# Patient Record
Sex: FEMALE | Race: BLACK OR AFRICAN AMERICAN | Employment: UNEMPLOYED | URBAN - METROPOLITAN AREA
[De-identification: names, ages, dates, MRNs, and addresses within clinical notes are randomized per-mention and may not be internally consistent; named-entity substitution may affect disease eponyms.]

---

## 2019-04-23 ENCOUNTER — MEDICAL CORRESPONDENCE (OUTPATIENT)
Dept: HEALTH INFORMATION MANAGEMENT | Facility: CLINIC | Age: 54
End: 2019-04-23

## 2019-04-23 ENCOUNTER — TRANSFERRED RECORDS (OUTPATIENT)
Dept: HEALTH INFORMATION MANAGEMENT | Facility: CLINIC | Age: 54
End: 2019-04-23

## 2019-05-21 ENCOUNTER — OFFICE VISIT (OUTPATIENT)
Dept: OPHTHALMOLOGY | Facility: CLINIC | Age: 54
End: 2019-05-21
Attending: OPHTHALMOLOGY
Payer: COMMERCIAL

## 2019-05-21 DIAGNOSIS — Z96.1 PSEUDOPHAKIA: ICD-10-CM

## 2019-05-21 DIAGNOSIS — H25.811 COMBINED FORM OF AGE-RELATED CATARACT, RIGHT EYE: Primary | ICD-10-CM

## 2019-05-21 PROCEDURE — 76519 ECHO EXAM OF EYE: CPT | Mod: ZF | Performed by: OPHTHALMOLOGY

## 2019-05-21 PROCEDURE — 92015 DETERMINE REFRACTIVE STATE: CPT | Mod: ZF

## 2019-05-21 PROCEDURE — G0463 HOSPITAL OUTPT CLINIC VISIT: HCPCS | Mod: ZF

## 2019-05-21 ASSESSMENT — REFRACTION_WEARINGRX
OD_ADD: +2.25
OS_ADD: +2.25
OS_SPHERE: -2.50
OD_SPHERE: +1.50
OS_CYLINDER: +1.00
OS_AXIS: 035

## 2019-05-21 ASSESSMENT — TONOMETRY
OS_IOP_MMHG: 10
IOP_METHOD: TONOPEN
OD_IOP_MMHG: 11

## 2019-05-21 ASSESSMENT — CONF VISUAL FIELD
METHOD: COUNTING FINGERS
OS_NORMAL: 1
OD_NORMAL: 1

## 2019-05-21 ASSESSMENT — REFRACTION_MANIFEST
OD_SPHERE: -1.00
OD_CYLINDER: +1.50
OS_CYLINDER: +0.25
OS_SPHERE: -0.75
OS_AXIS: 100
OD_ADD: +2.50
OS_ADD: +2.50
OD_AXIS: 005

## 2019-05-21 ASSESSMENT — CUP TO DISC RATIO
OD_RATIO: 0.5
OS_RATIO: 0.5

## 2019-05-21 ASSESSMENT — SLIT LAMP EXAM - LIDS: COMMENTS: NORMAL

## 2019-05-21 ASSESSMENT — EXTERNAL EXAM - LEFT EYE: OS_EXAM: NORMAL

## 2019-05-21 ASSESSMENT — VISUAL ACUITY
OS_SC: 20/30
OD_PH_SC: 20/40
METHOD: SNELLEN - LINEAR
OS_PH_SC: 20/25
OD_SC: 20/60

## 2019-05-21 ASSESSMENT — EXTERNAL EXAM - RIGHT EYE: OD_EXAM: NORMAL

## 2019-05-21 NOTE — NURSING NOTE
Chief Complaints and History of Present Illnesses   Patient presents with     Cataract     Chief Complaint(s) and History of Present Illness(es)     Cataract               Comments     Eye exam to consider cataract surgery for the right eye.  The patient denies eye pain.  She has notices right eye blurry vision.  RUDDY Kerns 2:23 PM 05/21/2019

## 2019-05-21 NOTE — PROGRESS NOTES
Chief Complaint(s) and History of Present Illness(es)     Cataract               Comments     Eye exam to consider cataract surgery for the right eye.  The patient denies eye pain.  She has notices right eye blurry vision.  RUDDY Kerns 2:23 PM 05/21/2019             Review of systems for the eyes was negative other than the pertinent positives/negatives listed in the HPI.      Assessment & Plan      Shantel Jamison is a 54 year old female with the following diagnoses:   1. Combined form of age-related cataract, right eye    2. Pseudophakia - Left Eye         Cataract, right eye  Visually significant with blur and glare    Risks, benefits and alternatives to cataract extraction/IOL implantation discussed; consent obtained.  Will schedule surgery today    Special equipment/needs:    Anesthesia:MAC with block  Dilation:Moderate  Iris expansion:IFIS  Pseudoexfoliation: No pseudoexfoliation  Trypan Blue: Yes  SN60  Goal emmetropia  Will call to schedule after Ramadan         Attending Physician Attestation:  Complete documentation of historical and exam elements from today's encounter can be found in the full encounter summary report (not reduplicated in this progress note).  I personally obtained the chief complaint(s) and history of present illness.  I confirmed and edited as necessary the review of systems, past medical/surgical history, family history, social history, and examination findings as documented by others; and I examined the patient myself.  I personally reviewed the relevant tests, images, and reports as documented above.  I formulated and edited as necessary the assessment and plan and discussed the findings and management plan with the patient and family. . - Sourav Doherty MD

## 2019-06-14 ENCOUNTER — HOSPITAL ENCOUNTER (OUTPATIENT)
Facility: AMBULATORY SURGERY CENTER | Age: 54
End: 2019-06-14
Attending: OPHTHALMOLOGY
Payer: COMMERCIAL

## 2019-06-14 ENCOUNTER — TELEPHONE (OUTPATIENT)
Dept: OPHTHALMOLOGY | Facility: CLINIC | Age: 54
End: 2019-06-14

## 2019-06-14 DIAGNOSIS — H25.811 COMBINED FORM OF AGE-RELATED CATARACT, RIGHT EYE: Primary | ICD-10-CM

## 2019-06-14 NOTE — TELEPHONE ENCOUNTER
Patient is scheduled for surgery with Dr. Doherty     Spoke or left message with: patient    Date of Surgery: 7/1    [place] [facility] [place]    Informed patient they will need an adult  yes    H&P: Scheduled with [facility]    Surgery packet: mailed outside 6/14

## 2019-06-28 ENCOUNTER — ANESTHESIA EVENT (OUTPATIENT)
Dept: SURGERY | Facility: AMBULATORY SURGERY CENTER | Age: 54
End: 2019-06-28

## 2019-06-28 RX ORDER — KETOROLAC TROMETHAMINE 30 MG/ML
30 INJECTION, SOLUTION INTRAMUSCULAR; INTRAVENOUS EVERY 6 HOURS PRN
Status: CANCELLED | OUTPATIENT
Start: 2019-06-28 | End: 2019-07-03

## 2019-06-28 RX ORDER — ACETAMINOPHEN 325 MG/1
975 TABLET ORAL ONCE
Status: CANCELLED | OUTPATIENT
Start: 2019-06-28 | End: 2019-06-28

## 2019-06-28 RX ORDER — PHENYLEPHRINE HYDROCHLORIDE 25 MG/ML
1 SOLUTION/ DROPS OPHTHALMIC
Status: CANCELLED | OUTPATIENT
Start: 2019-06-28

## 2019-06-28 RX ORDER — FENTANYL CITRATE 50 UG/ML
25-50 INJECTION, SOLUTION INTRAMUSCULAR; INTRAVENOUS EVERY 5 MIN PRN
Status: CANCELLED | OUTPATIENT
Start: 2019-06-28

## 2019-06-28 RX ORDER — ONDANSETRON 4 MG/1
4 TABLET, ORALLY DISINTEGRATING ORAL EVERY 30 MIN PRN
Status: CANCELLED | OUTPATIENT
Start: 2019-06-28

## 2019-06-28 RX ORDER — PROPARACAINE HYDROCHLORIDE 5 MG/ML
1 SOLUTION/ DROPS OPHTHALMIC ONCE
Status: CANCELLED | OUTPATIENT
Start: 2019-06-28 | End: 2019-06-28

## 2019-06-28 RX ORDER — TROPICAMIDE 10 MG/ML
1 SOLUTION/ DROPS OPHTHALMIC
Status: CANCELLED | OUTPATIENT
Start: 2019-06-28

## 2019-06-28 RX ORDER — NALOXONE HYDROCHLORIDE 0.4 MG/ML
.1-.4 INJECTION, SOLUTION INTRAMUSCULAR; INTRAVENOUS; SUBCUTANEOUS
Status: CANCELLED | OUTPATIENT
Start: 2019-06-28 | End: 2019-06-29

## 2019-06-28 RX ORDER — SODIUM CHLORIDE, SODIUM LACTATE, POTASSIUM CHLORIDE, CALCIUM CHLORIDE 600; 310; 30; 20 MG/100ML; MG/100ML; MG/100ML; MG/100ML
INJECTION, SOLUTION INTRAVENOUS CONTINUOUS
Status: CANCELLED | OUTPATIENT
Start: 2019-06-28

## 2019-06-28 RX ORDER — OXYCODONE HYDROCHLORIDE 5 MG/1
5 TABLET ORAL EVERY 4 HOURS PRN
Status: CANCELLED | OUTPATIENT
Start: 2019-06-28

## 2019-06-28 RX ORDER — CYCLOPENTOLATE HYDROCHLORIDE 10 MG/ML
1 SOLUTION/ DROPS OPHTHALMIC
Status: CANCELLED | OUTPATIENT
Start: 2019-06-28

## 2019-06-28 RX ORDER — ONDANSETRON 2 MG/ML
4 INJECTION INTRAMUSCULAR; INTRAVENOUS EVERY 30 MIN PRN
Status: CANCELLED | OUTPATIENT
Start: 2019-06-28

## 2019-07-01 ENCOUNTER — ANESTHESIA (OUTPATIENT)
Dept: SURGERY | Facility: AMBULATORY SURGERY CENTER | Age: 54
End: 2019-07-01

## 2019-07-01 VITALS — HEIGHT: 67 IN | BODY MASS INDEX: 33.9 KG/M2 | WEIGHT: 216 LBS

## 2019-07-01 ASSESSMENT — MIFFLIN-ST. JEOR: SCORE: 1607.4

## 2019-08-19 ENCOUNTER — ANESTHESIA EVENT (OUTPATIENT)
Dept: SURGERY | Facility: AMBULATORY SURGERY CENTER | Age: 54
End: 2019-08-19

## 2019-08-19 ENCOUNTER — ANESTHESIA (OUTPATIENT)
Dept: SURGERY | Facility: AMBULATORY SURGERY CENTER | Age: 54
End: 2019-08-19

## 2019-08-19 ENCOUNTER — HOSPITAL ENCOUNTER (OUTPATIENT)
Facility: AMBULATORY SURGERY CENTER | Age: 54
End: 2019-08-19
Attending: OPHTHALMOLOGY
Payer: COMMERCIAL

## 2019-08-19 ENCOUNTER — OFFICE VISIT (OUTPATIENT)
Dept: OPHTHALMOLOGY | Facility: CLINIC | Age: 54
End: 2019-08-19
Payer: COMMERCIAL

## 2019-08-19 ENCOUNTER — OFFICE VISIT (OUTPATIENT)
Dept: INTERPRETER SERVICES | Facility: CLINIC | Age: 54
End: 2019-08-19
Payer: COMMERCIAL

## 2019-08-19 VITALS
BODY MASS INDEX: 33.19 KG/M2 | HEIGHT: 68 IN | RESPIRATION RATE: 16 BRPM | OXYGEN SATURATION: 96 % | SYSTOLIC BLOOD PRESSURE: 132 MMHG | TEMPERATURE: 97.4 F | WEIGHT: 219 LBS | HEART RATE: 74 BPM | DIASTOLIC BLOOD PRESSURE: 75 MMHG

## 2019-08-19 DIAGNOSIS — H25.813 COMBINED FORM OF AGE-RELATED CATARACT, BOTH EYES: Primary | ICD-10-CM

## 2019-08-19 DIAGNOSIS — Z96.1 PSEUDOPHAKIA: Primary | ICD-10-CM

## 2019-08-19 DEVICE — EYE IMP IOL ALCON PCL SN60WF ACRYSOF IQ 23.5: Type: IMPLANTABLE DEVICE | Site: EYE | Status: FUNCTIONAL

## 2019-08-19 RX ORDER — PROPARACAINE HYDROCHLORIDE 5 MG/ML
1 SOLUTION/ DROPS OPHTHALMIC ONCE
Status: COMPLETED | OUTPATIENT
Start: 2019-08-19 | End: 2019-08-19

## 2019-08-19 RX ORDER — TIMOLOL 5 MG/ML
SOLUTION/ DROPS OPHTHALMIC PRN
Status: DISCONTINUED | OUTPATIENT
Start: 2019-08-19 | End: 2019-08-19 | Stop reason: HOSPADM

## 2019-08-19 RX ORDER — TROPICAMIDE 10 MG/ML
1 SOLUTION/ DROPS OPHTHALMIC
Status: COMPLETED | OUTPATIENT
Start: 2019-08-19 | End: 2019-08-19

## 2019-08-19 RX ORDER — CYCLOPENTOLATE HYDROCHLORIDE 10 MG/ML
1 SOLUTION/ DROPS OPHTHALMIC
Status: COMPLETED | OUTPATIENT
Start: 2019-08-19 | End: 2019-08-19

## 2019-08-19 RX ORDER — OFLOXACIN 3 MG/ML
1 SOLUTION/ DROPS OPHTHALMIC 3 TIMES DAILY
Qty: 5 ML | Refills: 1 | Status: SHIPPED | OUTPATIENT
Start: 2019-08-19

## 2019-08-19 RX ORDER — LIDOCAINE 40 MG/G
CREAM TOPICAL
Status: DISCONTINUED | OUTPATIENT
Start: 2019-08-19 | End: 2019-08-19 | Stop reason: HOSPADM

## 2019-08-19 RX ORDER — MOXIFLOXACIN IN NACL,ISO-OS/PF 0.3MG/0.3
SYRINGE (ML) INTRAOCULAR PRN
Status: DISCONTINUED | OUTPATIENT
Start: 2019-08-19 | End: 2019-08-19 | Stop reason: HOSPADM

## 2019-08-19 RX ORDER — BALANCED SALT SOLUTION 6.4; .75; .48; .3; 3.9; 1.7 MG/ML; MG/ML; MG/ML; MG/ML; MG/ML; MG/ML
SOLUTION OPHTHALMIC PRN
Status: DISCONTINUED | OUTPATIENT
Start: 2019-08-19 | End: 2019-08-19 | Stop reason: HOSPADM

## 2019-08-19 RX ORDER — DEXAMETHASONE SODIUM PHOSPHATE 4 MG/ML
INJECTION, SOLUTION INTRA-ARTICULAR; INTRALESIONAL; INTRAMUSCULAR; INTRAVENOUS; SOFT TISSUE PRN
Status: DISCONTINUED | OUTPATIENT
Start: 2019-08-19 | End: 2019-08-19 | Stop reason: HOSPADM

## 2019-08-19 RX ORDER — SODIUM CHLORIDE, SODIUM LACTATE, POTASSIUM CHLORIDE, CALCIUM CHLORIDE 600; 310; 30; 20 MG/100ML; MG/100ML; MG/100ML; MG/100ML
INJECTION, SOLUTION INTRAVENOUS CONTINUOUS
Status: DISCONTINUED | OUTPATIENT
Start: 2019-08-19 | End: 2019-08-19 | Stop reason: HOSPADM

## 2019-08-19 RX ORDER — PROPOFOL 10 MG/ML
INJECTION, EMULSION INTRAVENOUS PRN
Status: DISCONTINUED | OUTPATIENT
Start: 2019-08-19 | End: 2019-08-19

## 2019-08-19 RX ORDER — PHENYLEPHRINE HYDROCHLORIDE 25 MG/ML
1 SOLUTION/ DROPS OPHTHALMIC
Status: COMPLETED | OUTPATIENT
Start: 2019-08-19 | End: 2019-08-19

## 2019-08-19 RX ORDER — PREDNISOLONE ACETATE 10 MG/ML
1 SUSPENSION/ DROPS OPHTHALMIC 4 TIMES DAILY
Qty: 5 ML | Refills: 1 | Status: SHIPPED | OUTPATIENT
Start: 2019-08-19

## 2019-08-19 RX ORDER — PROPOFOL 10 MG/ML
INJECTION, EMULSION INTRAVENOUS CONTINUOUS PRN
Status: DISCONTINUED | OUTPATIENT
Start: 2019-08-19 | End: 2019-08-19

## 2019-08-19 RX ADMIN — PROPARACAINE HYDROCHLORIDE 1 DROP: 5 SOLUTION/ DROPS OPHTHALMIC at 08:41

## 2019-08-19 RX ADMIN — SODIUM CHLORIDE, SODIUM LACTATE, POTASSIUM CHLORIDE, CALCIUM CHLORIDE: 600; 310; 30; 20 INJECTION, SOLUTION INTRAVENOUS at 08:56

## 2019-08-19 RX ADMIN — TROPICAMIDE 1 DROP: 10 SOLUTION/ DROPS OPHTHALMIC at 08:48

## 2019-08-19 RX ADMIN — PROPOFOL 20 MG: 10 INJECTION, EMULSION INTRAVENOUS at 09:51

## 2019-08-19 RX ADMIN — CYCLOPENTOLATE HYDROCHLORIDE 1 DROP: 10 SOLUTION/ DROPS OPHTHALMIC at 08:49

## 2019-08-19 RX ADMIN — PROPOFOL 100 MCG/KG/MIN: 10 INJECTION, EMULSION INTRAVENOUS at 09:48

## 2019-08-19 RX ADMIN — PHENYLEPHRINE HYDROCHLORIDE 1 DROP: 25 SOLUTION/ DROPS OPHTHALMIC at 08:55

## 2019-08-19 RX ADMIN — PHENYLEPHRINE HYDROCHLORIDE 1 DROP: 25 SOLUTION/ DROPS OPHTHALMIC at 08:48

## 2019-08-19 RX ADMIN — PHENYLEPHRINE HYDROCHLORIDE 1 DROP: 25 SOLUTION/ DROPS OPHTHALMIC at 08:42

## 2019-08-19 RX ADMIN — TROPICAMIDE 1 DROP: 10 SOLUTION/ DROPS OPHTHALMIC at 08:43

## 2019-08-19 RX ADMIN — TROPICAMIDE 1 DROP: 10 SOLUTION/ DROPS OPHTHALMIC at 08:55

## 2019-08-19 RX ADMIN — CYCLOPENTOLATE HYDROCHLORIDE 1 DROP: 10 SOLUTION/ DROPS OPHTHALMIC at 08:42

## 2019-08-19 RX ADMIN — CYCLOPENTOLATE HYDROCHLORIDE 1 DROP: 10 SOLUTION/ DROPS OPHTHALMIC at 08:55

## 2019-08-19 ASSESSMENT — VISUAL ACUITY
OD_SC+: -1
METHOD: SNELLEN - LINEAR
OS_SC+: +2
OD_SC: 20/80
OS_SC: 20/30

## 2019-08-19 ASSESSMENT — TONOMETRY
OD_IOP_MMHG: 13
OS_IOP_MMHG: 11
IOP_METHOD: TONOPEN

## 2019-08-19 ASSESSMENT — SLIT LAMP EXAM - LIDS: COMMENTS: PTOSIS

## 2019-08-19 ASSESSMENT — MIFFLIN-ST. JEOR: SCORE: 1632.39

## 2019-08-19 ASSESSMENT — LIFESTYLE VARIABLES: TOBACCO_USE: 1

## 2019-08-19 ASSESSMENT — EXTERNAL EXAM - RIGHT EYE: OD_EXAM: NORMAL

## 2019-08-19 NOTE — ANESTHESIA CARE TRANSFER NOTE
Patient: Shantel Jamison    Procedure(s):  Right Eye Phacoemulsification with Intraocular Lens    Diagnosis: Cataract  Diagnosis Additional Information: No value filed.    Anesthesia Type:   MAC     Note:  Airway :Room Air  Patient transferred to:Phase II  Comments: 143/75  98%  97.4-83-18  Handoff Report: Identifed the Patient, Identified the Reponsible Provider, Reviewed the pertinent medical history, Discussed the surgical course, Reviewed Intra-OP anesthesia mangement and issues during anesthesia, Set expectations for post-procedure period and Allowed opportunity for questions and acknowledgement of understanding      Vitals: (Last set prior to Anesthesia Care Transfer)    CRNA VITALS  8/19/2019 0947 - 8/19/2019 1022      8/19/2019             Pulse:  84    Ht Rate:  86    SpO2:  99 %    Resp Rate (set):  10                Electronically Signed By: FANNIE Martins CRNA  August 19, 2019  10:22 AM

## 2019-08-19 NOTE — ANESTHESIA PREPROCEDURE EVALUATION
Anesthesia Pre-Procedure Evaluation    Patient: Shantel Jamison   MRN:     [id number] Gender:   female   Age:    54 year old :      1965        Preoperative Diagnosis: Cataract   Procedure(s):  Right Eye Phacoemulsification with Intraocular Lens, Trypan, Dexamethasone     History reviewed. No pertinent past medical history.   Past Surgical History:   Procedure Laterality Date     CATARACT IOL, RT/LT  2014    LE     NO HISTORY OF SURGERY       PHACOEMULSIFICATION CLEAR CORNEA WITH STANDARD INTRAOCULAR LENS IMPLANT  2014    Procedure: PHACOEMULSIFICATION CLEAR CORNEA WITH STANDARD INTRAOCULAR LENS IMPLANT;  COMPLEX LEFT PHACOEMULSIFICATION CLEAR CORNEA WITH STANDARD INTRAOCULAR LENS IMPLANT;  Surgeon: Sourav Doherty MD;  Location: [facility]          Anesthesia Evaluation     . Pt has had prior anesthetic.     No history of anesthetic complications          ROS/MED HX    ENT/Pulmonary:  - neg pulmonary ROS   (+)YARA risk factors snores loudly, tobacco use, Past use , . .   (-) sleep apnea   Neurologic:  - neg neurologic ROS     Cardiovascular:  - neg cardiovascular ROS   (+) ----. : . . . :. . No previous cardiac testing       METS/Exercise Tolerance:  >4 METS   Hematologic:  - neg hematologic  ROS       Musculoskeletal:  - neg musculoskeletal ROS       GI/Hepatic:  - neg GI/hepatic ROS      (-) GERD   Renal/Genitourinary:  - ROS Renal section negative       Endo:  - neg endo ROS       Psychiatric:  - neg psychiatric ROS       Infectious Disease:  - neg infectious disease ROS       Malignancy:      - no malignancy   Other: Comment: No home meds   - neg other ROS                     PHYSICAL EXAM:   Mental Status/Neuro: A/A/O   Airway: Facies: Feasible  Mallampati: I  Mouth/Opening: Full  TM distance: > 6 cm  Neck ROM: Full   Respiratory: Auscultation: CTAB     Resp. Rate: Normal     Resp. Effort: Normal      CV: Rhythm: Regular  Rate: Age appropriate  Heart: Normal Sounds  Edema: None   Comments:   "    Dental: Normal Dentition                LABS:  CBC: No results found for: WBC, HGB, HCT, PLT  BMP: No results found for: NA, POTASSIUM, CHLORIDE, CO2, BUN, CR, GLC  COAGS: No results found for: PTT, INR, FIBR  POC: No results found for: BGM, HCG, HCGS  OTHER: No results found for: PH, LACT, A1C, KATHRIN, PHOS, MAG, ALBUMIN, PROTTOTAL, ALT, AST, GGT, ALKPHOS, BILITOTAL, BILIDIRECT, LIPASE, AMYLASE, YAHAIRA, TSH, T4, T3, CRP, SED     Preop Vitals    BP Readings from Last 3 Encounters:   08/19/19 (!) 140/89   02/05/14 130/74    Pulse Readings from Last 3 Encounters:   08/19/19 76      Resp Readings from Last 3 Encounters:   08/19/19 16   02/05/14 18    SpO2 Readings from Last 3 Encounters:   08/19/19 95%   02/05/14 97%      Temp Readings from Last 1 Encounters:   08/19/19 36.8  C (98.2  F) (Oral)    Ht Readings from Last 1 Encounters:   08/19/19 1.72 m (5' 7.72\")      Wt Readings from Last 1 Encounters:   08/19/19 99.3 kg (219 lb)    Estimated body mass index is 33.58 kg/m  as calculated from the following:    Height as of this encounter: 1.72 m (5' 7.72\").    Weight as of this encounter: 99.3 kg (219 lb).     LDA:        Assessment:   ASA SCORE: 2    H&P: History and physical reviewed and following examination; no interval change.   Smoking Status:  Non-Smoker/Unknown   NPO Status: NPO Appropriate     Plan:   Anes. Type:  MAC   Pre-Medication: None   Induction:  N/a   Airway: Native Airway   Access/Monitoring: PIV   Maintenance: N/a     Postop Plan:   Postop Pain: None  Postop Sedation/Airway: Not planned  Disposition: Outpatient     PONV Management:   Adult Risk Factors: Female, Non-Smoker     CONSENT: Direct conversation   Plan and risks discussed with: Patient   Blood Products: Consent Deferred (Minimal Blood Loss)                   Ras Noel, DO  "

## 2019-08-19 NOTE — DISCHARGE INSTRUCTIONS
"[facility]  Home Care Following Anesthesia  For 24 hours after surgery:  1. Get plenty of rest.  A responsible adult must stay with you for at least 24 hours after you leave the surgery center.  2. Do not drive or use heavy equipment.  If you have weakness or tingling, don't drive or use heavy equipment until this feeling goes away.   3. Do not drink alcohol.   4. Avoid strenuous or risky activities.  Ask for help when climbing stairs.  5. You may feel lightheaded.  IF so, sit for a few minutes before standing.  Have someone help you get up.   6. If you have nausea (feel sick to your stomach): Drink only clear liquids such as apple juice, ginger ale, broth or 7-Up.  Rest may also help.  Be sure to drink enough fluids.  Move to a regular diet as you feel able.   7. You may have a slight fever.  Call the doctor if your fever is over 100 F (37.7 C) (taken under the tongue) or lasts longer than 24 hours.  8. You may have a dry mouth, a sore throat, muscle aches or trouble sleeping. These should go away after 24 hours.  9. Do not make important or legal decisions.        Today you received a Marcaine or bupivacaine block to numb the nerves near your surgery site.  This is a block using local anesthetic or \"numbing\" medication injected around the nerves to anesthetize or \"numb\" the area supplied by those nerves.  This block is injected into the muscle layer near your surgical site.  The medication may numb the location where you had surgery for 6-18 hours, but may last up to 24 hours.  If your surgical site is an arm or leg you should be careful with your affected limb, since it is possible to injure your limb without being aware of it due to the numbing.  Until full feeling returns, you should guard against bumping or hitting your limb, and avoid extreme hot or cold temperatures on the skin.  As the block wears off, the feeling will return as a tingling or prickly sensation near your " surgical site.  You will experience more discomfort from your incision as the feeling returns.  You may want to take a pain pill (a narcotic or Tylenol if this was prescribed by your surgeon) when you start to experience mild pain before the pain beccomes more severe.  If your pain medications do not control your pain you should notifiy your surgeon.    Tips for taking pain medications  To get the best pain relief possible, remember these points:    Take pain medications as directed, before pain becomes severe.    Pain medication can upset your stomach: taking it with food may help.    Constipation is a common side effect of pain medication. Drink plenty of  fluids.    Eat foods high in fiber. Take a stool softener if recommended by your doctor or pharmacist.    Do not drink alcohol, drive or operate machinery while taking pain medications.    Ask about other ways to control pain, such as with heat, ice or relaxation.    Tylenol/Acetaminophen Consumption  To help encourage the safe use of acetaminophen, the makers of TYLENOL  have lowered the maximum daily dose for single-ingredient Extra Strength TYLENOL  (acetaminophen) products sold in the [place] from 8 pills per day (4,000 mg) to 6 pills per day (3,000 mg). The dosing interval has also changed from 2 pills every 4-6 hours to 2 pills every 6 hours.    If you feel your pain relief is insufficient, you may take Tylenol/Acetaminophen in addition to your narcotic pain medication.     Be careful not to exceed 3,000 mg of Tylenol/Acetaminophen in a 24 hour period from all sources.    If you are taking extra strength Tylenol/acetaminophen (500 mg), the maximum dose is 6 tablets in 24 hours.    If you are taking regular strength acetaminophen (325 mg), the maximum dose is 9 tablets in 24 hours.    Call a doctor for any of the followin. Signs of infection (fever, growing tenderness at the surgery site, a large amount of drainage or bleeding, severe pain, foul-smelling  drainage, redness, swelling).  2. It has been over 8 to 10 hours since surgery and you are still not able to urinate (pass water).  3. Headache for over 24 hours.  Your doctor is:       Dr. Sourav Doherty, Ophthalmology: [phone]               Or dial [phone] and ask for the resident on call for:  Ophthalmology  For emergency care, call the:  [facility]:  [phone] (TTY for hearing impaired: [phone])    [facility]     Home Care Following Cataract Surgery    If you have a gauze eye patch on, please do not remove it until it is removed by your doctor at your first appointment after your surgery.  You will start your eye drops the next day.    OR    If you only have a clear eye shield on, you may remove the eye shield when you get home and begin eye drops today as directed by your doctor.      Wear the clear eye shield for protection when sleeping for the next 5 days.      Do not rub the eye that had the operation.      Your eye might be sensitive to light.  Wearing sunglasses may be more comfortable for you.      You may have some discomfort and irritation.  Acetaminophen (Tylenol) or Ibuprofen (Advil) may be taken for discomfort. If pain persists please call your doctor s office.      Keep the eye that had the surgery dry. You may wash your hair, bathe or shower, but keep your eye closed while doing so.       Avoid bending over, strenuous activity or heavy lifting until this activity has been approved by your doctor.      You have a follow-up appointment with your doctor today or tomorrow at the [facility] ([phone]).  Bring all your prescribed eye drops with you to this follow-up appointment.        If you take glaucoma medications, bring them with you to your follow-up appointment tomorrow.      Use medication exactly as prescribed by your doctor. You may restart your regular home medications.       Occasional  blood-tinged tears are normal the day or two after surgery. However, if there is large or persistent bleeding from the eye, that is not normal, and you should contact the clinic.      Call your doctor s office at [phone] if any of the following should occur:    - Any sudden vision changes, including decreased vision  - Nausea or severe headache  - Increase in pain that is not controlled with Acetaminophen (Tylenol) or Ibuprofen (Advil)  - Signs of infection (pus, increasing redness or tenderness)  - Severe sensitivity to light  - An increase in floaters (black spots in front of your vision)

## 2019-08-19 NOTE — PROGRESS NOTES
Assessment & Plan      Shantel Jamison is a 54 year old female with the following diagnoses:   1. Pseudophakia - Right Eye         right eye, day 0    Doing well  Keep patch in place at night for 5 days  Start post-operative drops and taper according to instructions  Post-operative do's and don'ts reviewed, questions answered    Recheck 2-3 weeks with refraction           Attending Physician Attestation:  Complete documentation of historical and exam elements from today's encounter can be found in the full encounter summary report (not reduplicated in this progress note).  I personally obtained the chief complaint(s) and history of present illness.  I confirmed and edited as necessary the review of systems, past medical/surgical history, family history, social history, and examination findings as documented by others; and I examined the patient myself.  I personally reviewed the relevant tests, images, and reports as documented above.  I formulated and edited as necessary the assessment and plan and discussed the findings and management plan with the patient and family. . - Sourav Doherty MD

## 2019-08-19 NOTE — NURSING NOTE
Chief Complaints and History of Present Illnesses   Patient presents with     Post Op (Ophthalmology) Right Eye     POD#0 s/p CE/IOL RE     Chief Complaint(s) and History of Present Illness(es)     Post Op (Ophthalmology) Right Eye     Laterality: right eye    Quality: blurred vision    Associated symptoms: Negative for floaters, flashes and eye pain    Comments: POD#0 s/p CE/IOL RE              Comments     Patient notes that she is feeling okay after sx, no pain or concerns.     Leyla Catalan August 19, 2019 11:59 AM

## 2019-08-19 NOTE — ANESTHESIA POSTPROCEDURE EVALUATION
Anesthesia POST Procedure Evaluation    Patient: Shantel Jamison   MRN:     [id number] Gender:   female   Age:    54 year old :      1965        Preoperative Diagnosis: Cataract   Procedure(s):  Right Eye Phacoemulsification with Intraocular Lens   Postop Comments: No value filed.       Anesthesia Type:  Not documented  MAC    Reportable Event: NO     PAIN: Uncomplicated   Sign Out status: Comfortable, Well controlled pain     PONV: No PONV   Sign Out status:  No Nausea or Vomiting     Neuro/Psych: Uneventful perioperative course   Sign Out Status: Preoperative baseline; Age appropriate mentation     Airway/Resp.: Uneventful perioperative course   Sign Out Status: Non labored breathing, age appropriate RR; Resp. Status within EXPECTED Parameters     CV: Uneventful perioperative course   Sign Out status: Appropriate BP and perfusion indices; Appropriate HR/Rhythm     Disposition:   Sign Out in:  PACU  Disposition:  Phase II; Home  Recovery Course: Uneventful  Follow-Up: Not required           Last Anesthesia Record Vitals:  CRNA VITALS  2019 0947 - 2019 1047      2019             Pulse:  84    Ht Rate:  86    SpO2:  99 %    Resp Rate (set):  10          Last PACU Vitals:  Vitals Value Taken Time   /75 2019 10:21 AM   Temp 36.3  C (97.4  F) 2019 10:21 AM   Pulse 78 2019 10:21 AM   Resp 16 2019 10:21 AM   SpO2 98 % 2019 10:21 AM   Temp src Skin 2019 10:07 AM   NIBP 139/71 2019 10:15 AM   Pulse 84 2019 10:17 AM   SpO2 99 % 2019 10:17 AM   Resp     Temp 34.2  C (93.6  F) 2019 10:13 AM   Ht Rate 86 2019 10:17 AM   Temp 2 0  C (32  F) 2019 10:13 AM         Electronically Signed By: Ras Noel DO, 2019, 11:51 AM

## 2019-08-19 NOTE — OP NOTE
PREOPERATIVE DIAGNOSIS: Visually significant cataract, Right eye   POSTOPERATIVE DIAGNOSIS: Same   PROCEDURES:   1. Cataract extraction with intraocular lens implant Right eye.  SURGEON: Sourav Doherty M.D.  Assistant: Leonard Piper MD      INDICATIONS: The patient Shantel Jamison presented to the eye clinic with decreased vision secondary to cataract in the Right eye. The risks, benefits and alternatives to cataract extraction were discussed. The patient elected to proceed. All questions were answered to the patient's satisfaction.   DESCRIPTION OF PROCEDURE:Prior to the procedure, appropriate cardiac and respiratory monitors were applied to the patient.  In the pre-operative holding area, under monitored anesthesia care, a retrobulbar injection of 2% lidocaine with hyaluronidase was given.  The patient was brought to the operating room where a surgical pause was carried out to identify with all members of the surgical team the correct surgical site.  With adequate anesthesia and akinesia, the Right eye was prepped and draped in the usual sterile fashion. A lid speculum was placed, and the operating microscope was rotated into position. A paracentesis was created.  Through this limbal paracentesis, the anterior chamber was inflated with dispersive viscoelastic. A temporal wound was created at the limbus using a 2.5 mm blade. A capsulorrhexis was initiated using a bent 25-gauge needle and was completed in continuous and circular fashion using the capsulorrhexis forceps. The lens nucleus was hydrodissected using balanced salt solution.  The lens nucleus was rotated and removed using phacoemulsification in a stop and chop technique.  Residual cortical material was removed using irrigation-aspiration.  The capsular bag was reinflated to its maximal extent with cohesive viscoelastic.  A 23.5 diopter [id number] inserted into the capsular bag.  The lens power selected was reviewed using the intraocular lens power  measurements that were obtained preoperatively to confirm that the correct lens was selected for the desired post-operative refractive state. The residual viscoelastic was removed in its entirety, the wound were hydrated and found to be self-sealing.  Intracameral moxifloxacin was administered. Tactile pressure was confirmed to be in a normal range.  Subconjunctival Dexamethasone (2 mg) was injected.. The lid speculum was removed and a patch and shield were applied.  The patient tolerated the procedure well, and there were no complications.   PLAN: The patient will be discharged to home and will follow up tomorrow morning in the eye clinic.  EBL:  None  Complications:  None  Implant Name Type Inv. Item Serial No.  Lot No. LRB No. Used   EYE IMP IOL SHELLY PCL [id number] ACRYSOF IQ 23.5 Lens/Eye Implant EYE IMP IOL SHELLY PCL [id number] ACRYSOF IQ 23.5 [id number] [employer]  Right 1           Attending Physician Procedure Attestation: I was present for the entire procedure       Sourav Doherty MD  , Comprehensive Ophthalmology  Department of Ophthalmology and Visual Neurosciences  [facility]

## 2019-09-03 ENCOUNTER — OFFICE VISIT (OUTPATIENT)
Dept: OPHTHALMOLOGY | Facility: CLINIC | Age: 54
End: 2019-09-03
Attending: OPHTHALMOLOGY
Payer: COMMERCIAL

## 2019-09-03 DIAGNOSIS — Z96.1 PSEUDOPHAKIA: Primary | ICD-10-CM

## 2019-09-03 PROCEDURE — 92015 DETERMINE REFRACTIVE STATE: CPT | Mod: ZF

## 2019-09-03 PROCEDURE — G0463 HOSPITAL OUTPT CLINIC VISIT: HCPCS | Mod: ZF

## 2019-09-03 ASSESSMENT — REFRACTION_WEARINGRX
OS_AXIS: 035
OD_ADD: +2.25
OS_ADD: +2.25
OS_CYLINDER: +1.00
OD_SPHERE: +1.50
OS_SPHERE: -2.50

## 2019-09-03 ASSESSMENT — REFRACTION_MANIFEST
OS_AXIS: 085
OS_CYLINDER: +0.75
OS_SPHERE: -1.25
OD_CYLINDER: SPHERE
OS_ADD: +2.50
OD_ADD: +2.50
OD_SPHERE: -1.00

## 2019-09-03 ASSESSMENT — TONOMETRY
IOP_METHOD: TONOPEN
OS_IOP_MMHG: 10
OD_IOP_MMHG: 06

## 2019-09-03 ASSESSMENT — SLIT LAMP EXAM - LIDS: COMMENTS: PTOSIS

## 2019-09-03 ASSESSMENT — CONF VISUAL FIELD
METHOD: COUNTING FINGERS
OS_NORMAL: 1
OD_NORMAL: 1

## 2019-09-03 ASSESSMENT — EXTERNAL EXAM - LEFT EYE: OS_EXAM: NORMAL

## 2019-09-03 ASSESSMENT — VISUAL ACUITY
METHOD: SNELLEN - LINEAR
OD_SC+: -2
OD_SC: 20/25
OS_SC: 20/20
OS_SC+: -2

## 2019-09-03 ASSESSMENT — CUP TO DISC RATIO
OS_RATIO: 0.5
OD_RATIO: 0.5

## 2019-09-03 ASSESSMENT — EXTERNAL EXAM - RIGHT EYE: OD_EXAM: NORMAL

## 2019-09-03 NOTE — NURSING NOTE
Chief Complaints and History of Present Illnesses   Patient presents with     Follow Up     2 week follow up s/p CE/IOL right eye     Chief Complaint(s) and History of Present Illness(es)     Follow Up     Comments: 2 week follow up s/p CE/IOL right eye              Comments     Pt states vision has been good since last visit. Some irritation in RE that comes and goes.    DEMARCO Macias September 3, 2019 1:23 PM

## 2019-09-03 NOTE — PROGRESS NOTES
Chief Complaint(s) and History of Present Illness(es)     Follow Up     Comments: 2 week follow up s/p CE/IOL right eye              Comments     Pt states vision has been good since last visit. Some irritation in RE   that comes and goes.    DEMARCO Macias September 3, 2019 1:23 PM                Review of systems for the eyes was negative other than the pertinent positives/negatives listed in the HPI.      Assessment & Plan      Shantel Jamison is a 54 year old female with the following diagnoses:   1. Pseudophakia - Both Eyes         Doing well, very happy with vision  Ok to resume normal activities  Taper Predforte as directed  Glasses prescription updated  Artificial tears as needed        Patient disposition:   Return in about 1 year (around 9/3/2020) for DFE.           Attending Physician Attestation:  Complete documentation of historical and exam elements from today's encounter can be found in the full encounter summary report (not reduplicated in this progress note).  I personally obtained the chief complaint(s) and history of present illness.  I confirmed and edited as necessary the review of systems, past medical/surgical history, family history, social history, and examination findings as documented by others; and I examined the patient myself.  I personally reviewed the relevant tests, images, and reports as documented above.  I formulated and edited as necessary the assessment and plan and discussed the findings and management plan with the patient and family. . - Sourav Doherty MD

## 2020-06-29 ENCOUNTER — APPOINTMENT (OUTPATIENT)
Dept: INTERPRETER SERVICES | Facility: CLINIC | Age: 55
End: 2020-06-29
Payer: COMMERCIAL

## (undated) DEVICE — GLOVE PROTEXIS MICRO 7.5  2D73PM75

## (undated) DEVICE — PACK CATARACT CUSTOM ASC SEY15CPUMC

## (undated) DEVICE — LINEN TOWEL PACK X5 5464

## (undated) DEVICE — EYE PACK CUSTOM ANTERIOR 30DEG TIP CENTURION PPK6682-04

## (undated) DEVICE — EYE SHIELD PLASTIC

## (undated) DEVICE — EYE CANN IRR 25GA CYSTOTOME 581610

## (undated) DEVICE — EYE KNIFE STILETTO VISITEC 1.1MM ANG 45DEG SIDEPORT 376620

## (undated) DEVICE — EYE CANN IRR 27GA ANTERIOR CHAMBER 581280

## (undated) DEVICE — EYE TIP IRRIGATION & ASPIRATION POLYMER CVD 0.3MM 8065751512

## (undated) DEVICE — EYE KNIFE SLIT XSTAR VISITEC 2.6MM 45DEG 373726

## (undated) RX ORDER — DEXAMETHASONE SODIUM PHOSPHATE 4 MG/ML
INJECTION, SOLUTION INTRA-ARTICULAR; INTRALESIONAL; INTRAMUSCULAR; INTRAVENOUS; SOFT TISSUE
Status: DISPENSED
Start: 2019-08-19

## (undated) RX ORDER — FENTANYL CITRATE 50 UG/ML
INJECTION, SOLUTION INTRAMUSCULAR; INTRAVENOUS
Status: DISPENSED
Start: 2019-08-19

## (undated) RX ORDER — PHENYLEPHRINE HCL IN 0.9% NACL 1 MG/10 ML
SYRINGE (ML) INTRAVENOUS
Status: DISPENSED
Start: 2019-08-19

## (undated) RX ORDER — DEXAMETHASONE SODIUM PHOSPHATE 10 MG/ML
INJECTION, SOLUTION INTRAMUSCULAR; INTRAVENOUS
Status: DISPENSED
Start: 2019-08-19

## (undated) RX ORDER — PROPOFOL 10 MG/ML
INJECTION, EMULSION INTRAVENOUS
Status: DISPENSED
Start: 2019-08-19

## (undated) RX ORDER — GLYCOPYRROLATE 0.2 MG/ML
INJECTION INTRAMUSCULAR; INTRAVENOUS
Status: DISPENSED
Start: 2019-08-19

## (undated) RX ORDER — LABETALOL 20 MG/4 ML (5 MG/ML) INTRAVENOUS SYRINGE
Status: DISPENSED
Start: 2019-08-19

## (undated) RX ORDER — ONDANSETRON 2 MG/ML
INJECTION INTRAMUSCULAR; INTRAVENOUS
Status: DISPENSED
Start: 2019-08-19